# Patient Record
Sex: MALE | Race: WHITE | NOT HISPANIC OR LATINO | Employment: FULL TIME | URBAN - METROPOLITAN AREA
[De-identification: names, ages, dates, MRNs, and addresses within clinical notes are randomized per-mention and may not be internally consistent; named-entity substitution may affect disease eponyms.]

---

## 2022-08-31 ENCOUNTER — TELEPHONE (OUTPATIENT)
Dept: SURGERY | Facility: CLINIC | Age: 37
End: 2022-08-31

## 2022-08-31 NOTE — TELEPHONE ENCOUNTER
WE RECEIVED AN OUTSIDE REFERRAL FROM JARON GODDARD TO  FOR [facility]ERNIA/CALLED PT TO SCHEDULE NEXT AVAILABLE W/[facility] FOR [facility]ERN[facility]/HUB OK TO SCHEDULE APPT

## 2022-09-06 ENCOUNTER — HOSPITAL ENCOUNTER (OUTPATIENT)
Dept: CT IMAGING | Facility: HOSPITAL | Age: 37
Discharge: HOME | End: 2022-09-06
Admitting: SURGERY

## 2022-09-06 ENCOUNTER — OFFICE VISIT (OUTPATIENT)
Dept: SURGERY | Facility: CLINIC | Age: 37
End: 2022-09-06

## 2022-09-06 VITALS — BODY MASS INDEX: 31.01 KG/M2 | RESPIRATION RATE: 16 BRPM | HEIGHT: 73 IN | WEIGHT: 234 LBS

## 2022-09-06 DIAGNOSIS — R10.33 UMBILICAL PAIN: Primary | ICD-10-CM

## 2022-09-06 DIAGNOSIS — R10.33 UMBILICAL PAIN: ICD-10-CM

## 2022-09-06 PROCEDURE — 99202 OFFICE O/P NEW SF 15 MIN: CPT | Performed by: SURGERY

## 2022-09-06 PROCEDURE — 0 IOPAMIDOL PER 1 ML: Performed by: SURGERY

## 2022-09-06 PROCEDURE — 74177 CT ABD & PELVIS W/CONTRAST: CPT

## 2022-09-06 RX ADMIN — IOPAMIDOL 100 ML: 755 INJECTION, SOLUTION INTRAVENOUS at 15:19

## 2022-09-06 NOTE — PROGRESS NOTES
"Chief Complaint:  Hernia (Umbilical )    Primary Care Provider: Tyler Salmeron APRN    Referring Provider: Tyler Salmeron APRN    History of Present Illness  TARYN Hernandez is a 37 y.o. male referred by TYRONE Magana for a possible umbilical hernia.  The patient has pain at his umbilicus but there is no noticeable bulge and he said he had some drainage from his umbilicus recently.  He said the drainage was yellow and bloody colored.  The pain improved after the drainage started.  The drainage is stopped.  He said he had the same thing happened about 11 months ago.  No prior abdominal surgeries.  No abdominal imaging.    Allergies: Patient has no known allergies.    No outpatient medications have been marked as taking for the 9/6/22 encounter (Office Visit) with Kike Alvarez MD.       No past medical history on file.     Past Surgical History:   • ELBOW PROCEDURE    Pinched nerve in right elbow       Family History:   Family History   Problem Relation Age of Onset   • Asthma Maternal Grandmother         Social History:  Social History     Tobacco Use   • Smoking status: Current Some Day Smoker     Packs/day: 0.25     Years: 10.00     Pack years: 2.50     Types: Cigarettes   • Smokeless tobacco: Current User     Types: Chew   • Tobacco comment: quit 9 years ago- started back recently. sometimes uses dip   Substance Use Topics   • Alcohol use: Defer       Objective     Vital Signs:  Resp 16   Ht 185.4 cm (73\")   Wt 106 kg (234 lb)   BMI 30.87 kg/m²   • Constitutional: healthy appearing, alert, no acute distress, reliable historian  • HENT:  NCAT, no visible deformities or lesions  • Eyes:  sclerae clear, conjunctivae clear, EOMI  • Neck:  normal appearance, no masses, trachea midline  • Respiratory:  breathing not labored, respiratory effort appears normal  • Cardiovascular:  heart regular rate  • Abdomen:  Soft, nondistended, no detectable bulge or abnormality at the umbilicus but the " umbilical area is tender with deep palpation.  The umbilical skin is normal appearing and without any erythema or drainage.  • Skin and subcutaneous tissue:  no visible concerning rashes or lesions, no jaundice  • Musculoskeletal: moving all extremities symmetrically and purposefully  • Neurologic:  no obvious motor or sensory deficits, normal gait, able to stand without difficulty, cerebellar function without any obvious abnormalities, alert & oriented x 3, speech clear  • Psychiatric:  judgment and insight intact, mood normal, affect appropriate, cooperative      Assessment:  Umbilical pain - no detectable umbilical hernia on exam    Plan:  -CT abdomen/pelvis to evaluate for possible hernia  -I will call the patient on the telephone sometime after I have the CT scan results    Addendum, 9/12/22:  CT a/p was done and showed there is no umbilical hernia or any visible abdominal wall hernia.  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  CT findings were discussed with the patient.  No surgical intervention necessary.  Patient will see me PRN.    Kike Alvarez MD  09/06/2022    Electronically signed by Kike Alvarez MD, 09/06/22, 9:07 AM EDT.    Electronically signed by Kike Alvarez MD, 09/12/22, 8:11 AM EDT.

## 2022-09-08 ENCOUNTER — TELEPHONE (OUTPATIENT)
Dept: SURGERY | Facility: CLINIC | Age: 37
End: 2022-09-08

## 2022-09-08 NOTE — TELEPHONE ENCOUNTER
I called the patient at approximately 4 PM today.  He did not  his cell phone but I left a message on his recorder explaining the CT scan result.